# Patient Record
Sex: FEMALE | URBAN - METROPOLITAN AREA
[De-identification: names, ages, dates, MRNs, and addresses within clinical notes are randomized per-mention and may not be internally consistent; named-entity substitution may affect disease eponyms.]

---

## 2022-09-06 ENCOUNTER — HOSPITAL ENCOUNTER (EMERGENCY)
Facility: OTHER | Age: 22
Discharge: HOME | End: 2022-09-06
Attending: EMERGENCY MEDICINE
Payer: COMMERCIAL

## 2022-09-06 VITALS
BODY MASS INDEX: 24.91 KG/M2 | OXYGEN SATURATION: 98 % | DIASTOLIC BLOOD PRESSURE: 82 MMHG | HEIGHT: 66 IN | SYSTOLIC BLOOD PRESSURE: 130 MMHG | WEIGHT: 155 LBS | HEART RATE: 74 BPM | TEMPERATURE: 97 F | RESPIRATION RATE: 18 BRPM

## 2022-09-06 DIAGNOSIS — S61.412A LACERATION OF LEFT HAND WITHOUT FOREIGN BODY, INITIAL ENCOUNTER: Primary | ICD-10-CM

## 2022-09-06 PROCEDURE — 99284 EMERGENCY DEPT VISIT MOD MDM: CPT | Mod: 25

## 2022-09-06 PROCEDURE — 12001 RPR S/N/AX/GEN/TRNK 2.5CM/<: CPT | Mod: LT

## 2022-09-06 PROCEDURE — 96372 THER/PROPH/DIAG INJ SC/IM: CPT

## 2022-09-06 PROCEDURE — 63600175 PHARM REV CODE 636 W HCPCS

## 2022-09-06 PROCEDURE — 25000003 PHARM REV CODE 250: Performed by: PHYSICIAN ASSISTANT

## 2022-09-06 RX ORDER — KETOROLAC TROMETHAMINE 10 MG/1
10 TABLET, FILM COATED ORAL
Status: DISCONTINUED | OUTPATIENT
Start: 2022-09-06 | End: 2022-09-06

## 2022-09-06 RX ORDER — MUPIROCIN 20 MG/G
OINTMENT TOPICAL 3 TIMES DAILY
Qty: 1 G | Refills: 0 | Status: SHIPPED | OUTPATIENT
Start: 2022-09-06

## 2022-09-06 RX ORDER — LIDOCAINE HYDROCHLORIDE 10 MG/ML
10 INJECTION INFILTRATION; PERINEURAL
Status: COMPLETED | OUTPATIENT
Start: 2022-09-06 | End: 2022-09-06

## 2022-09-06 RX ORDER — KETOROLAC TROMETHAMINE 30 MG/ML
30 INJECTION, SOLUTION INTRAMUSCULAR; INTRAVENOUS
Status: COMPLETED | OUTPATIENT
Start: 2022-09-06 | End: 2022-09-06

## 2022-09-06 RX ADMIN — KETOROLAC TROMETHAMINE 30 MG: 30 INJECTION, SOLUTION INTRAMUSCULAR; INTRAVENOUS at 03:09

## 2022-09-06 RX ADMIN — LIDOCAINE HYDROCHLORIDE 10 ML: 10 INJECTION, SOLUTION INFILTRATION; PERINEURAL at 01:09

## 2022-09-06 NOTE — DISCHARGE INSTRUCTIONS
Keep sutures/staples and wound clean and dry.  Avoid submersion underwater; use soap, clean water, and gentle scrubbing to clean area.  Apply a new sterile bandage when existing bandage becomes dirty or saturated.  Take all antibiotics if prescribed.   Monitor the wound regularly for any evidence of infection, including redness, drainage, increasing pain, or fever.   Follow-up with your PCP or return to ER as directed for wound re-check and suture/staple removal.

## 2022-09-06 NOTE — ED NOTES
Pt educated on discharge instructions and follow up care, pt educated on wound care. Pt verbalized understanding, denies any questions

## 2022-09-06 NOTE — FIRST PROVIDER EVALUATION
Emergency Department TeleTriage Encounter Note      CHIEF COMPLAINT    Chief Complaint   Patient presents with    Laceration     Pt c.o laceratioin to left thumb and 2 nd digit with knife while making a sandwhich onset 1115. Pt is UTD with tetanus. Pt ha u shaped cut to left thumb. Pt able to move thumb in all joints.  Pt has approx 1 cm simple lac to left 2 nd digit. Pt is able to move all joints on 2nd finger.  Bleeding controlled.  Non stick dressing applied.        VITAL SIGNS   Initial Vitals [09/06/22 1201]   BP Pulse Resp Temp SpO2   (!) 144/87 76 18 98.1 °F (36.7 °C) 99 %      MAP       --            ALLERGIES    Review of patient's allergies indicates:  No Known Allergies    PROVIDER TRIAGE NOTE  This is a teletriage evaluation of a 22 y.o. female presenting to the ED complaining of laceration to her left thumb and index finger.  She accidentally cut herself with a knife.  She states her tetanus is up to date. She denies any known foreign body and states she can move her fingers well.     Initial orders will be placed and care will be transferred to an alternate provider when patient is roomed for a full evaluation. Any additional orders and the final disposition will be determined by that provider.         ORDERS  Labs Reviewed - No data to display    ED Orders (720h ago, onward)      Start Ordered     Status Ordering Provider    09/06/22 1300 09/06/22 1256  LIDOcaine HCL 10 mg/ml (1%) injection 10 mL  ED 1 Time         Ordered BETTIE DICKSON    09/06/22 1257 09/06/22 1256  Provide suture tray to patient bedside  Once         Ordered BETTIE DICKSON              Virtual Visit Note: The provider triage portion of this emergency department evaluation and documentation was performed via [employer], a HIPAA-compliant telemedicine application, in concert with a tele-presenter in the room. A face to face patient evaluation with one of my colleagues will occur once the patient is placed in an  emergency department room.      DISCLAIMER: This note was prepared with [employer] voice recognition transcription software. Garbled syntax, mangled pronouns, and other bizarre constructions may be attributed to that software system.

## 2022-09-06 NOTE — Clinical Note
"Betsey Malhotraelle" Silke was seen and treated in our emergency department on 9/6/2022.  She may return to school on 09/07/2022.      If you have any questions or concerns, please don't hesitate to call.      Shy Park PA-C"

## 2022-09-06 NOTE — ED NOTES
Patient sitting on stretcher, AAOX4, even unlabored respirations noted. VSS. No acute distress noted. Bed in lowest position, side rails up X 1 call light in reach. Denies any needs.

## 2022-09-06 NOTE — ED NOTES
Pt ambulated to , pt is AAOX4, even unlabored resp noted, VSS, NADN. Pt c/o laceration to l thumb and 2nd digit, after cutting fingers with clean knife. No active bleeding, full ROM, dressing on lac at thsi time. Lac tray set up at bedside

## 2022-09-06 NOTE — ED PROVIDER NOTES
"Source of History:  Patient    Chief complaint:  Laceration (Pt c.o laceratioin to left thumb and 2 nd digit with knife while making a sandwhich onset 1115. Pt is UTD with tetanus. Pt ha u shaped cut to left thumb. Pt able to move thumb in all joints.  Pt has approx 1 cm simple lac to left 2 nd digit. Pt is able to move all joints on 2nd finger.  Bleeding controlled.  Non stick dressing applied. )      HPI:  Betsey Edouard is a 22 y.o. female presenting with laceration to left thumb and pointer finger.  Patient reports that she was home making a salad when she caught her thumb and index finger with the knife she was using.  She states that she immediately applied pressure and came to the emergency room.  She has not taken anything for pain.  Patient is not on blood thinners, and is up-to-date with her tetanus shot.  Denies fever, chills, joint pain, sensory or motor deficits to her hand.    This is the extent to the patients complaints today here in the emergency department.    ROS: As per HPI and below:  General: No fever.  No chills.  Head: No headache.  No loss of consciousness or amnesia.  Neck:  No neck pain  Back:  No back pain  Extremities:  +laceration (left thumb, left index finger)  Respiratory: No shortness of breath.  No chest pain.  Cardiovascular: No palpitations.  Abdomen: No abdominal pain.  No nausea or vomiting.  Integument: No rashes or bruising.  Eyes: No visual changes.  Urinary: No hematuria.  Neurologic: No numbness.  No focal weakness.     Review of patient's allergies indicates:  No Known Allergies    PMH:  As per HPI and below:  No past medical history on file.  No past surgical history on file.       Physical Exam:    /82   Pulse 74   Temp 97 °F (36.1 °C)   Resp 18   Ht 5' 6" (1.676 m)   Wt 70.3 kg (155 lb)   SpO2 98%   BMI 25.02 kg/m²   Nursing note and vital signs reviewed.  Appearance: No acute distress.  Head/Face: Atraumatic.  No Shea sign or raccoon " eyes.  Neck: No Midline cervical tenderness, step-offs or deformities.  Full range of motion.    Back: No midline thoracic, lumbar or sacral spine tenderness, step-offs or deformities.    Chest: No chest wall tenderness.  Breath sounds are equal bilaterally.  No wheezes.  No rhonchi.  No rales.  Cardiovascular: Regular rate and rhythm.  No murmurs.  No gallops.  No rubs.  Abdomen: Soft. Nontender.   No distention.  No guarding. No rebound.  No ecchymoses. Non-peritoneal.  Musculoskeletal:  Good range of motion to all digits.  Patient can make a fist. No bony tenderness in the extremities.  No deformities.    Integument: 2.5 cm U-shaped laceration to the palmar aspect of her left thumb.  1.5 cm straight laceration to the lateral aspect of her left index finger. No ecchymoses. No evidence of retained foreign body.  Neurologic: Motor intact.  Sensation intact.  Cranial nerves II through XII intact.  Cerebellar exam intact. Neurovascularly intact  Mental status: Alert and oriented x 3.  GCS 15.    MDM:    Urgent evaluation of a 22 y.o. female with laceration to the left thumb and index finger. Patient is afebrile, nontoxic appearing and hemodynamically stable.  Hemostases achieved in triage. Physical exam reveals a 2.5 cm laceration on the palmar aspect of her left thumb and 1.5 cm superficial laceration on the lateral aspect of her left index finger. I do not feel as though imaging is necessary at this time as I am fully able to visualize within the laceration that there is no foreign body.  Patient with full range of motion to all digits and strength intact so not concerned for fracture at this time. Plan to extensively irrigate the wounds and repair laceration on thumb with sutures, and superficial laceration on the index finger with Dermabond.  Will administer Toradol for pain.    Differential diagnosis includes but is not limited to:  Laceration, nerve injury, tendon/ligament injury, vascular injury, fracture,  retained foreign body, abrasion.    ED management  The wound was irrigated extensively and inspected for foreign body, underlying structure injury, or other associated complications, and none were found. The wound was repaired per the following procedure note. The patient was given appropriate wound care instructions, including keeping wound clean/dry, use of sterile bandages, and avoiding submersion in standing water. Due to the nature of the wound, antibiotics were not deemed necessary.  Patient with known up-to-date tetanus status so no vaccination at this time The patient was instructed to regularly monitor the wound for any evidence of infection, including redness, fever, or drainage. The patient was counseled on follow-up with PCP or return to ER in 7 days for wound re-check and suture removal. Patient stated understanding and comfortable with plan of care.           Lac Repair    Date/Time: 9/6/2022 10:59 PM  Performed by: Shy Park PA-C  Authorized by: Dany Reardon MD     Consent:     Consent obtained:  Verbal    Consent given by:  Patient    Risks, benefits, and alternatives were discussed: yes      Risks discussed:  Infection and nerve damage  Anesthesia:     Anesthesia method:  Local infiltration    Local anesthetic:  Lidocaine 1% w/o epi  Laceration details:     Location:  Hand (Palmar aspect of left thumb)    Length (cm):  2.5  Pre-procedure details:     Preparation:  Patient was prepped and draped in usual sterile fashion  Exploration:     Hemostasis achieved with:  Direct pressure    Imaging outcome: foreign body not noted      Wound exploration: wound explored through full range of motion and entire depth of wound visualized      Wound extent: no fascia violation noted, no muscle damage noted and no underlying fracture noted      Contaminated: no    Treatment:     Area cleansed with:  Saline and povidone-iodine    Amount of cleaning:  Extensive    Irrigation solution:  Sterile  saline    Irrigation volume:  500 mL    Irrigation method:  Pressure wash    Visualized foreign bodies/material removed: no      Debridement:  None  Skin repair:     Repair method:  Sutures    Suture size:  4-0    Suture material:  Prolene    Suture technique:  Simple interrupted    Number of sutures:  6  Approximation:     Approximation:  Close  Post-procedure details:     Dressing:  Non-adherent dressing    Procedure completion:  Tolerated well, no immediate complications       Diagnostic Impression:    1. Laceration of left hand without foreign body, initial encounter         ED Disposition Condition    Discharge Stable            ED Prescriptions       Medication Sig Dispense Start Date End Date Auth. Provider    mupirocin (BACTROBAN) 2 % ointment Apply topically 3 (three) times daily. 1 g 9/6/2022 -- Shy Park PA-C          Follow-up Information       Follow up With Specialties Details Why Contact Info    [facility] - Emergency Dept Emergency Medicine  If symptoms worsen [id number] [facility] [id number] [phone]             Shy Park PA-C  09/06/22 4745
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
## 2024-10-14 ENCOUNTER — OFFICE VISIT (OUTPATIENT)
Dept: OBSTETRICS AND GYNECOLOGY | Facility: CLINIC | Age: 24
End: 2024-10-14
Payer: COMMERCIAL

## 2024-10-14 VITALS — DIASTOLIC BLOOD PRESSURE: 80 MMHG | SYSTOLIC BLOOD PRESSURE: 130 MMHG | WEIGHT: 143.31 LBS

## 2024-10-14 DIAGNOSIS — Z12.39 SCREENING BREAST EXAMINATION: ICD-10-CM

## 2024-10-14 DIAGNOSIS — Z01.419 WOMEN'S ANNUAL ROUTINE GYNECOLOGICAL EXAMINATION: Primary | ICD-10-CM

## 2024-10-14 DIAGNOSIS — Z12.4 ENCOUNTER FOR PAPANICOLAOU SMEAR FOR CERVICAL CANCER SCREENING: ICD-10-CM

## 2024-10-14 PROCEDURE — 99385 PREV VISIT NEW AGE 18-39: CPT | Mod: S$GLB,,, | Performed by: PHYSICIAN ASSISTANT

## 2024-10-14 PROCEDURE — 99999 PR PBB SHADOW E&M-NEW PATIENT-LVL II: CPT | Mod: PBBFAC,,, | Performed by: PHYSICIAN ASSISTANT

## 2024-10-14 PROCEDURE — 3075F SYST BP GE 130 - 139MM HG: CPT | Mod: CPTII,S$GLB,, | Performed by: PHYSICIAN ASSISTANT

## 2024-10-14 PROCEDURE — 1159F MED LIST DOCD IN RCRD: CPT | Mod: CPTII,S$GLB,, | Performed by: PHYSICIAN ASSISTANT

## 2024-10-14 PROCEDURE — 3079F DIAST BP 80-89 MM HG: CPT | Mod: CPTII,S$GLB,, | Performed by: PHYSICIAN ASSISTANT
